# Patient Record
Sex: MALE | Race: BLACK OR AFRICAN AMERICAN
[De-identification: names, ages, dates, MRNs, and addresses within clinical notes are randomized per-mention and may not be internally consistent; named-entity substitution may affect disease eponyms.]

---

## 2019-06-21 ENCOUNTER — HOSPITAL ENCOUNTER (INPATIENT)
Dept: HOSPITAL 1 - ED | Age: 54
LOS: 3 days | Discharge: ANOTHER HOSPITAL | DRG: 282 | End: 2019-06-24
Attending: INTERNAL MEDICINE | Admitting: INTERNAL MEDICINE
Payer: COMMERCIAL

## 2019-06-21 VITALS
BODY MASS INDEX: 27.47 KG/M2 | BODY MASS INDEX: 27.47 KG/M2 | WEIGHT: 175 LBS | WEIGHT: 175 LBS | HEIGHT: 67 IN | HEIGHT: 67 IN

## 2019-06-21 VITALS — DIASTOLIC BLOOD PRESSURE: 89 MMHG | SYSTOLIC BLOOD PRESSURE: 123 MMHG

## 2019-06-21 DIAGNOSIS — Z79.899: ICD-10-CM

## 2019-06-21 DIAGNOSIS — I10: ICD-10-CM

## 2019-06-21 DIAGNOSIS — K74.60: ICD-10-CM

## 2019-06-21 DIAGNOSIS — I21.4: Primary | ICD-10-CM

## 2019-06-21 DIAGNOSIS — I25.10: ICD-10-CM

## 2019-06-21 LAB
ALBUMIN SERPL-MCNC: 3.8 G/DL (ref 3.4–5)
ALP SERPL-CCNC: 60 U/L (ref 46–116)
ALT SERPL-CCNC: 35 U/L (ref 16–63)
AST SERPL-CCNC: 29 U/L (ref 15–37)
BASOPHILS NFR BLD: 0.4 % (ref 0–2)
BILIRUB SERPL-MCNC: 0.7 MG/DL (ref 0.2–1)
BUN SERPL-MCNC: 13 MG/DL (ref 7–18)
CALCIUM SERPL-MCNC: 8.5 MG/DL (ref 8.5–10.1)
CHLORIDE SERPL-SCNC: 101 MMOL/L (ref 98–107)
CHOLEST SERPL-MCNC: 173 MG/DL (ref ?–200)
CO2 SERPL-SCNC: 25.6 MMOL/L (ref 21–32)
CREAT SERPL-MCNC: 1.3 MG/DL (ref 0.7–1.3)
ERYTHROCYTE [DISTWIDTH] IN BLOOD BY AUTOMATED COUNT: 13.4 % (ref 11.5–14.5)
GFR SERPLBLD BASED ON 1.73 SQ M-ARVRAT: > 60 ML/MIN
GLUCOSE SERPL-MCNC: 217 MG/DL (ref 74–106)
HDLC SERPL-MCNC: 45 MG/DL (ref 40–60)
PHOSPHATE SERPL-MCNC: 3.6 MG/DL (ref 2.5–4.9)
PLATELET # BLD: 255 X10^3MCL (ref 130–400)
POTASSIUM SERPL-SCNC: 3.7 MMOL/L (ref 3.5–5.1)
PROT SERPL-MCNC: 8 G/DL (ref 6.4–8.2)
SODIUM SERPL-SCNC: 140 MMOL/L (ref 136–145)
URATE SERPL-MCNC: 8 MG/DL (ref 3.5–7.2)

## 2019-06-21 PROCEDURE — G0378 HOSPITAL OBSERVATION PER HR: HCPCS

## 2019-06-21 NOTE — NUR
PT BIB [facility], FROM [facility], FOR SYNCOPAL EPISODE. PER MEDIC, STAFF FROM
[facility] CALLED REPORTING MAN DOWN IN THE SHOWER, PULSELESS AND APNEIC, [facility] STARTED
COMPRESSIONS. PER MEDIC, THEY ARRIVED ON SCENE AT 1914, PT HAD A PULSE AND
BREATHING, VS WNL. PER MEDIC, CIM STS THAT THEY GAVE 5 ROUNDS OF NARCAN, BUT
UNAWARE OF THE DOSE. PER MEDIC, EKG DONE EN ROUTE W/ NSR.  EN ROUTE. PER
MEDIC PT "JUST SEEMED ALTERED, MAKING INCOMPREHENSIBLE SOUNDS." PT A&O X4 UPON
ASSESSMENT, SPEAKING SOFTLY, IN COMPLETE SENTENCES, ANSWERING QUESTIONS
APPROPRIATELY. PT REPORTS CHEST "HEAVINESS", ABRASION NOTED OVER STERNUM. PT
REPORTS THAT HIS MOUTH IS DRY. PT REPORTS NO OTHER COMPLAINTS. PT PLACED ON
CARDIAC MONITOR, PULSE OX, AND 2L O2 VIA NC. EKG BEING DONE AT BEDSIDE. VSS,
RESPS E/U, NAD NOTED AT THIS TIME. MD AT BEDSIDE FOR MSE. BED IN LOW AND LOCKED
POSITION, 2 BED RAILS UP, PT'S HANDS CUFFED TO SELF. 2 OFFICERS AT BEDSIDE.
WILL CONTINUE TO MONITOR.

## 2019-06-21 NOTE — NUR
PT ASKED TO PROVIDE URINE SAMPLE. PT STS THAT HE NEEDS WATER, AND CANNOT
PROVIDE URINE RIGHT NOW. PT GIVEN WATER.

## 2019-06-21 NOTE — NUR
RECEIVED PT FROM [facility]. NO ACUTE DISTRESS OBSERVED AT THIS TIME.
BREATHING EVEN AND UNLABORED. COMFORT AND SAFETY MEASURES IN PLACE. ALL NEEDS
ASSESSED AND ATTENDED TO. CALL LIGHT WITHIN REACH. WILL CONTINUE TO MONITOR

## 2019-06-21 NOTE — NUR
[facility] ACCOMPANIED BY RN, TRANSPORTER, AND 2
snf GUARDS. PT A/A/O X 4, CALM, COOPERATIVE; WEARS GLASSES (NOT W/ PT).
AMBULATORY, NO GAIT OR BALANCE IMPAIRMENT [place].
ON TELE # 1, NSR, HR 78, C/O CONSTANT ACHING C/P TO MEDIAL RIGHT CHEST,
EXACERBATED BY INSPIRATION, MOVEMENT, AND TWISTING; ABRASION NOTED TO SAME
SITE 2/2 CPR PT RECEIVED. PT STATES HE DOES NOT REMEMBER ANYTHING AFTER HE HAD
SYNCOPAL EPISODE. LUNGS CTAB, CHEST RISING EVENLY, 4LNC, 97%, NO ACUTE
RESPIRATORY DISTRESS NOTED. ABD DISTENDED, FIRM, NORMOACTIVE BOWEL SOUNDS AND
TYMPANY UPON PERCUSSION X 4 QUADS, LAST BM 06/21/19, FORMED. NOTED SYNDACTYLY
TO R INDEX/MIDDLE FINGER AND MISSING/DEFORMED FINGERS TO L HAND. IV SITE LAC
18G, CDI. ORIENTED PT TO ROOM, BED CONTROLS, CALL LIGHT SYSTEM. SIDE RAILS UP
X 2, BED IN LOW POSITION, 2 GUARDS BY BEDSIDE. WILL ENDORSE TO [place].

## 2019-06-21 NOTE — NUR
ATTEMPTED TO CALL REPORT. RN STS THAT SHE IS IN ISO ROOM TAKING WOUND PHOTOS
AND WILL HAVE TO CALL BACK

## 2019-06-21 NOTE — NUR
PT ASKED TO PROVIDE URINE SAMPLE, PT STS THAT HE STILL CANNOT GO. PT GIVEN MORE
WATER. INFORMED PT THAT I WILL RETURN SHORTLY FOR SAMPLE.

## 2019-06-22 VITALS — SYSTOLIC BLOOD PRESSURE: 128 MMHG | DIASTOLIC BLOOD PRESSURE: 82 MMHG

## 2019-06-22 VITALS — DIASTOLIC BLOOD PRESSURE: 88 MMHG | SYSTOLIC BLOOD PRESSURE: 128 MMHG

## 2019-06-22 VITALS — DIASTOLIC BLOOD PRESSURE: 78 MMHG | SYSTOLIC BLOOD PRESSURE: 132 MMHG

## 2019-06-22 VITALS — DIASTOLIC BLOOD PRESSURE: 75 MMHG | SYSTOLIC BLOOD PRESSURE: 129 MMHG

## 2019-06-22 VITALS — DIASTOLIC BLOOD PRESSURE: 85 MMHG | SYSTOLIC BLOOD PRESSURE: 118 MMHG

## 2019-06-22 LAB
AMPHETAMINES UR QL SCN: (no result)
BASOPHILS NFR BLD: 0.3 % (ref 0–2)
ERYTHROCYTE [DISTWIDTH] IN BLOOD BY AUTOMATED COUNT: 14.1 % (ref 11.5–14.5)
ERYTHROCYTE [SEDIMENTATION RATE] IN BLOOD BY WESTERGREN METHOD: 21 MM/HR (ref 0–20)
PLATELET # BLD: 252 X10^3MCL (ref 130–400)
T4 SERPL-MCNC: 6.5 UG/DL (ref 4.7–13.3)

## 2019-06-22 NOTE — NUR
RECEIVED T.O FROM DR. MIGUEL TO REQUEST SS FOR TRANSFER TO HIGHER LEVEL OF CARE
FOR ANGIOGRAM AND TO START PT ON LOVENOX 80MG SQ BID AND ASA 81 MG PO DAILY.
ORDERS CARRIED OUT.
DR[facility] LEYVA [facility] MD TO
ORDER THROUGHT CM TO MAKE ARRANGEMENTS.
ATTENDING NURSE MADE AWARE.

## 2019-06-22 NOTE — NUR
RECIEVED PT RESTING IN BED WITH NO ACUTE DISTRESS NOTED AT THIS TIME,
CORRECTIONS OFFICERS AT BEDSIDE, PT DENIES PAIN OR SOB AT THIS TIME,
ASSESSMENT PERFORMED AT THIS TIME, SAFETY PRECAUTIONS IN PLACE, WILL CONTINUE
TO MONITOR.

## 2019-06-22 NOTE — NUR
RECEIVED PT. IN BED A/A/O X3. NO SOB, NO N/V NOTED. PT. DENIES ANY PAIN AT
THIS TIME. PT. DENIES ANY DIZZINESS. IV SITE NOTED TO TOI GELLER. CIM OFFICERS X2 AT
BEDSIDE. BED IN LOW POS., CALL LIGHT WITHIN REACH. SIDE RAILS UP X3.

## 2019-06-22 NOTE — NUR
PT SLEEPING IN BED COMFORTABLY, NO ACUTE DISTRESS AT THIS TIME, RESPIRATIONS
EVEN AND UNLABORED, CORRECTIONS OFFICERS AT BEDSIDE, SAFETY PRECAUTIONS IN
PLACE, WILL CONTINUE TO MONITOR

## 2019-06-22 NOTE — NUR
PT COMPLAINTING OF INSOMNIA REQUESTING SOMETHING TO HELP HIM SLEEP,
ADMINISTERED AMBIEN PER ORDER (SEE MAR)

## 2019-06-22 NOTE — NUR
NO SIGNIFICANT CHANGES TO REPORT, PT COMPLIED WITH NURSING CARE THROUGHOUT THE
SHIFT WITH NO ACUTE EVENTS OVERNIGHT. NO ACUTE DISTRESS NOTED AT THIS TIME, PT
LAYING IN BED, BREATHING EVEN AND UNLABORED, AROUSABLE TO VERBAL STIMULI.
COMFORT AND SAFETY MEASURES MAINTAINED. ALL NEEDS ASSESSED AND ATTENDED TO.
CALL LIGHT WITHIN REACH. WILL CONTINUE TO MONITOR AND ENDORSE CARE TO DAY
SHIFT NURSE.

## 2019-06-23 VITALS — DIASTOLIC BLOOD PRESSURE: 79 MMHG | SYSTOLIC BLOOD PRESSURE: 100 MMHG

## 2019-06-23 VITALS — SYSTOLIC BLOOD PRESSURE: 145 MMHG | DIASTOLIC BLOOD PRESSURE: 80 MMHG

## 2019-06-23 VITALS — SYSTOLIC BLOOD PRESSURE: 137 MMHG | DIASTOLIC BLOOD PRESSURE: 84 MMHG

## 2019-06-23 VITALS — SYSTOLIC BLOOD PRESSURE: 151 MMHG | DIASTOLIC BLOOD PRESSURE: 65 MMHG

## 2019-06-23 VITALS — DIASTOLIC BLOOD PRESSURE: 89 MMHG | SYSTOLIC BLOOD PRESSURE: 148 MMHG

## 2019-06-23 VITALS — SYSTOLIC BLOOD PRESSURE: 146 MMHG | DIASTOLIC BLOOD PRESSURE: 91 MMHG

## 2019-06-23 LAB
BASOPHILS NFR BLD: 0.4 % (ref 0–2)
BUN SERPL-MCNC: 17 MG/DL (ref 7–18)
CALCIUM SERPL-MCNC: 9.1 MG/DL (ref 8.5–10.1)
CHLORIDE SERPL-SCNC: 104 MMOL/L (ref 98–107)
CO2 SERPL-SCNC: 25 MMOL/L (ref 21–32)
CREAT SERPL-MCNC: 1 MG/DL (ref 0.7–1.3)
ERYTHROCYTE [DISTWIDTH] IN BLOOD BY AUTOMATED COUNT: 13.5 % (ref 11.5–14.5)
GFR SERPLBLD BASED ON 1.73 SQ M-ARVRAT: > 60 ML/MIN
GLUCOSE SERPL-MCNC: 99 MG/DL (ref 74–106)
PLATELET # BLD: 234 X10^3MCL (ref 130–400)
POTASSIUM SERPL-SCNC: 3.7 MMOL/L (ref 3.5–5.1)
SODIUM SERPL-SCNC: 139 MMOL/L (ref 136–145)

## 2019-06-23 NOTE — NUR
RECEIVED PT. IN BED A/A/O X3. NO SOB, NO N/V NOTED. PT. DENIES ANY PAIN AT
THIS TIME. PT. DENIES DIZZINESS AT THE PRESENT TIME. CIM OFFICERS X2 AT
BEDSIDE. IV SITE NOTED TO L AC. BED IN LOW POS., CALL LIGHT WITHIN REACH. SIDE
RAILS UP X3.

## 2019-06-23 NOTE — NUR
PT SLEEPING COMFORTABLY, NO ACUTE DISTRESS AT THIS TIME, RESPIRATIONS EVEN AND
UNLABORED, CORECTIONS OFFICERS AT BEDSIDE, SAFETY PRECAUTIONS IN PLACE , WILL
CONTINUE TO MONITOR

## 2019-06-23 NOTE — NUR
PT HAD NO ACUTE DISTRESS THROUGH THE NIGHT, PT REQUESTED AMBIEN FOR INSOMNIA
AND WHICH WAS ADMINISTERED AND PT SLEPT THROUGH NIGHT, PT HAD NO COMPLAINTS OF
CHEST PAIN OR SOB THROUGH SHIFT, CORRECTIONS OFFICERS AT BEDSIDE
THROUGH SHIFT SAFETY PRECAUTIONS WERE MAINTAINED, WILL CONTINUE TO MONITOR AND
ENDORSE CARE TO ONCOMING RN

## 2019-06-23 NOTE — NUR
CALLED AND NOTIFIED DR. LEYVA REGARDING THE PLAN TO TRANSFER PT. FOR HIGHER
LEVEL OF CARE FOR ANGIOGRAM. DR. LEYVA STATED OK FOR TRANSFER ARRANGEMENT TO BE
STARTED BY THE HOSPITAL  TOMORROW.

## 2019-06-23 NOTE — NUR
RECIEVED PT RESTING IN BED COMFORTABLY WITH CORRECTIONS OFFICERS AT BEDSIDE,
ASSESSMENT PERFORMED AT THIS TIME, PT DENIES PAIN OR SOB AT THIS TIME, IV TO
THE LEFT FOREARM, SALINE LOCKED, SAFETY PRECAUTIONS IN PLACE, WILL CONTINUE
TO MONITOR

## 2019-06-24 VITALS — DIASTOLIC BLOOD PRESSURE: 74 MMHG | SYSTOLIC BLOOD PRESSURE: 155 MMHG

## 2019-06-24 VITALS — SYSTOLIC BLOOD PRESSURE: 127 MMHG | DIASTOLIC BLOOD PRESSURE: 79 MMHG

## 2019-06-24 VITALS — DIASTOLIC BLOOD PRESSURE: 79 MMHG | SYSTOLIC BLOOD PRESSURE: 127 MMHG

## 2019-06-24 VITALS — SYSTOLIC BLOOD PRESSURE: 144 MMHG | DIASTOLIC BLOOD PRESSURE: 93 MMHG

## 2019-06-24 VITALS — SYSTOLIC BLOOD PRESSURE: 112 MMHG | DIASTOLIC BLOOD PRESSURE: 76 MMHG

## 2019-06-24 LAB — RHEUMATOID FACT SERPL-ACNC: 11.3 IU/ML (ref 0–13.9)

## 2019-06-24 NOTE — NUR
PT SLEPT THROUGH THE MAJORITY OF THE NIGHT, PT HAD ONE EPISODE OF PAIN TREATED
WITH NORCO PRN PER ORDER, PT EXPRESSED ANXIETY AND DIFFICULTY SLEEPING WHICH
WAS TREATED WITH PRN ATIVAN PER ORDER (SEE MAR), CORRECTIONS OFFICERS AT
BEDSIDE THROUGH THE NIGHT, SAFETY PRECAUTIONS MAINTAINED THROUGH SHIFT WILL
CONTINUE TO MONITOR AND ENDORSE CARE TO ONCOMING RN

## 2019-06-24 NOTE — NUR
PATIENT IS ACCEPTED AT [facility], AWAITING ETA FOR [facility] FROM [facility]RECTIONAL
OFFICERS. OFFICERS REPORTED THAT [facility] IS CURRENTLY UNAVAILABLE AND THEIR
TREVER IS TRYING TO FIND REPLACEMENT [facility]. UPDATED  AND WILL
FOLLOW UP TO FIND TIME WHEN [facility] IS AVAILABLE SO PATIENT CAN BE
TRANSPORTED.

## 2019-06-24 NOTE — NUR
CO'S REPORTED THAT [facility] WILL BE AT [place] WITHIN 30 MINUTES. SPOKE WITH
CASE MANAGEMENT, MANOLO, AND INFORMED HER. CALLED [facility] SPOKE WITH MARCO A AND
ACTIVATED THE [facility] TRANSPORT TO [facility]. RECEIVED ETA OF 30 MINUTES FROM MARCO A AT
[facility]

## 2019-06-24 NOTE — NUR
OFFERED ORANGE GOWN TO PATIENT TO CHANGE, REMOVED TELE BOX #1 RETURN TO
[facility]. WAITING FOR [facility]. [facility] TRANSPORT HERE ALREADY GOT REPORT ON
PATIENT.

## 2019-06-24 NOTE — NUR
FOUND PATIENT SITTING UP IN BED WITH 2 CORRECTIONAL OFFICERS AT BEDSIDE.
PATIENT A/0X4 ON RA, NO APPARENT DISTRESS AT THIS TIME, EATING BREAKFAST
TRAY. IV TO LEFT AC, FLUSHES WELL. CALL LIGHT WITHIN REACH

## 2019-06-24 NOTE — NUR
[facility] ARRIVED TO TRANSPORT PATIENT. GAVE REPORT TO MEDIC. ANSWERED ALL
QUESTIONS. THEY ARE NOW WAITING FOR [facility] FROM [facility] TO COME

## 2019-06-24 NOTE — NUR
VISUALIZED PATIENT SITTING UP IN BED WATCHING TELEVISION. PATIENT HAS NO
COMPLAINTS AT THIS TIME. CALL LIGHT WITHIN REACH, TWO CORRECTIONAL OFFICERS IN
ROOM WITH PATIENT

## 2019-06-24 NOTE — NUR
PT SLEEPING IN BED WITH NO ACUTE DISTRESS NOTED AT THIS TIME, PT EASILY
AROUSABLE, DENIES PAIN OR SOB AT THIS TIME, CORRECTIONS OFFICERS AT BEDSIDE,
SAFETY PRECAUTIONS IN PLACE, WILL CONTINUE TO MONITOR

## 2019-06-24 NOTE — NUR
CALLED REPORT TO ALEX BOSCH AT [facility]. PATIENT IS GOING TO BE ACCEPTED IN ROOM
4417. ADDRESSED ALL QUESTIONS FROM RECEIVEING NURSE.

## 2019-06-24 NOTE — NUR
PT SLEEPING IN BED WITH NO ACUTE DISTRESS NOTED AT THIS TIME, RESPIRATIONS
EVEN AND UNLABORED, SAFETY PRECAUTIONS IN PLACE, WILL CONTINUE TO MONITOR

## 2019-06-24 NOTE — NUR
ADMNISTERED MEDICATIONS PER MAR. PATIENT REQUESTED SOMTHING TO COVER THE SKIN
OF HIS ANKLE TO REDUCE CONTACT WITH THE HANDCUFF RESTRAINT ON THE LEFT ANKLE

## 2019-06-24 NOTE — NUR
FOLLOW UP THE GUARDS FOR [facility] NO TIME YET, AIMEE FRENCH WAS INFORM AND WILL
CALL THE detention DEPARTMENT.